# Patient Record
Sex: FEMALE | Race: WHITE | NOT HISPANIC OR LATINO | URBAN - METROPOLITAN AREA
[De-identification: names, ages, dates, MRNs, and addresses within clinical notes are randomized per-mention and may not be internally consistent; named-entity substitution may affect disease eponyms.]

---

## 2020-09-17 ENCOUNTER — OFFICE VISIT (OUTPATIENT)
Dept: URBAN - METROPOLITAN AREA CLINIC 13 | Facility: CLINIC | Age: 70
End: 2020-09-17

## 2020-09-18 PROBLEM — 70153002 HEMORRHOIDS: Status: ACTIVE | Noted: 2020-09-18

## 2020-09-18 PROBLEM — 13644009 HYPERCHOLESTEROLEMIA: Status: ACTIVE | Noted: 2020-09-18

## 2020-09-18 PROBLEM — 313436004 TYPE II DIABETES MELLITUS WITHOUT COMPLICATION: Status: ACTIVE | Noted: 2020-09-18

## 2020-09-18 PROBLEM — 38341003 HYPERTENSION: Status: ACTIVE | Noted: 2020-09-18

## 2020-09-18 PROBLEM — 398050005 DIVERTICULAR DISEASE OF COLON: Status: ACTIVE | Noted: 2020-09-18

## 2020-10-12 ENCOUNTER — OFFICE VISIT (OUTPATIENT)
Dept: URBAN - METROPOLITAN AREA SURGERY CENTER 28 | Facility: SURGERY CENTER | Age: 70
End: 2020-10-12

## 2021-08-28 ENCOUNTER — TELEPHONE ENCOUNTER (OUTPATIENT)
Dept: URBAN - METROPOLITAN AREA CLINIC 13 | Facility: CLINIC | Age: 71
End: 2021-08-28

## 2021-08-29 ENCOUNTER — TELEPHONE ENCOUNTER (OUTPATIENT)
Dept: URBAN - METROPOLITAN AREA CLINIC 13 | Facility: CLINIC | Age: 71
End: 2021-08-29

## 2021-08-29 RX ORDER — LISINOPRIL AND HYDROCHLOROTHIAZIDE TABLETS 10; 12.5 MG/1; MG/1
TABLET ORAL
Status: ACTIVE | COMMUNITY

## 2021-08-29 RX ORDER — ONDANSETRON HYDROCHLORIDE 4 MG/1
TABLET, FILM COATED ORAL
Status: ACTIVE | COMMUNITY
Start: 2020-10-13

## 2021-08-29 RX ORDER — ASPIRIN 81 MG/1
TABLET ORAL
Status: ACTIVE | COMMUNITY

## 2021-08-29 RX ORDER — METFORMIN HYDROCHLORIDE 500 MG/1
TABLET ORAL
Status: ACTIVE | COMMUNITY

## 2023-11-07 ENCOUNTER — LAB OUTSIDE AN ENCOUNTER (OUTPATIENT)
Dept: URBAN - METROPOLITAN AREA CLINIC 44 | Facility: CLINIC | Age: 73
End: 2023-11-07

## 2023-11-07 ENCOUNTER — DASHBOARD ENCOUNTERS (OUTPATIENT)
Age: 73
End: 2023-11-07

## 2023-11-07 ENCOUNTER — OFFICE VISIT (OUTPATIENT)
Dept: URBAN - METROPOLITAN AREA CLINIC 44 | Facility: CLINIC | Age: 73
End: 2023-11-07
Payer: MEDICARE

## 2023-11-07 VITALS
DIASTOLIC BLOOD PRESSURE: 71 MMHG | TEMPERATURE: 97.3 F | WEIGHT: 172.6 LBS | BODY MASS INDEX: 31.76 KG/M2 | SYSTOLIC BLOOD PRESSURE: 154 MMHG | HEART RATE: 74 BPM | HEIGHT: 62 IN

## 2023-11-07 DIAGNOSIS — R10.30 LOWER ABDOMINAL PAIN: ICD-10-CM

## 2023-11-07 PROCEDURE — 99204 OFFICE O/P NEW MOD 45 MIN: CPT | Performed by: INTERNAL MEDICINE

## 2023-11-07 RX ORDER — TRIAMCINOLONE ACETONIDE 1 MG/G
1 APPLICATION CREAM TOPICAL
Refills: 1 | Status: ACTIVE | COMMUNITY

## 2023-11-07 RX ORDER — LOSARTAN POTASSIUM AND HYDROCHLOROTHIAZIDE 50; 12.5 MG/1; MG/1
1 TABLET TABLET, FILM COATED ORAL ONCE A DAY
Qty: 90 TABLET | Status: ACTIVE | COMMUNITY

## 2023-11-07 RX ORDER — CIPROFLOXACIN HYDROCHLORIDE 500 MG/1
1 TABLET TABLET, FILM COATED ORAL
Qty: 6 | OUTPATIENT
Start: 2023-11-07 | End: 2023-11-10

## 2023-11-07 RX ORDER — METFORMIN HYDROCHLORIDE 1000 MG/1
1 TABLET WITH A MEAL TABLET ORAL ONCE A DAY
Qty: 90 TABLET | Status: ACTIVE | COMMUNITY

## 2023-11-07 RX ORDER — PIOGLITAZONE 15 MG/1
1 TABLET TABLET ORAL ONCE A DAY
Qty: 90 TABLET | Status: ACTIVE | COMMUNITY

## 2023-11-07 RX ORDER — SULFAMETHOXAZOLE AND TRIMETHOPRIM 800; 160 MG/1; MG/1
1 TABLET TABLET ORAL TWICE A DAY
Qty: 10 TABLET | Refills: 1 | Status: ACTIVE | COMMUNITY

## 2023-11-07 RX ORDER — ASPIRIN 81 MG/1
1 TABLET TABLET ORAL ONCE A DAY
Status: ACTIVE | COMMUNITY

## 2023-11-07 RX ORDER — DILTIAZEM HYDROCHLORIDE 180 MG/1
1 CAPSULE CAPSULE, EXTENDED RELEASE ORAL ONCE A DAY
Qty: 90 CAPSULE | Status: ACTIVE | COMMUNITY

## 2023-11-07 NOTE — HPI-TODAY'S VISIT:
72 yo F presents for an OV for evaluation of chronic LLQ pain. Onset began 3 weeks ago and appears to be progressively worsened.  We last saw her in 2020 for a colonosopy that revealed severe diverticulosis during an admission to [facility] for bloody diarrhea and acute drop in blood indices; but no requiring blood products. Hb dropped from 14(nl) to 11. Work up during the admission revealed mild diverticulitis. In addition, she has a history of tubular adenomas seen on her last colonoscopy in 2016 and hx of ischemic colitis. She presents today s/p  watery diarrheal episodes and thin caliber stools. Mentions occasional streaks of blood in the stool.  Medication reconciliation: No blood thinners or NSAID use.  Her pain is about a 10 at its worse.

## 2023-11-15 ENCOUNTER — OUT OF OFFICE VISIT (OUTPATIENT)
Dept: URBAN - METROPOLITAN AREA SURGERY CENTER 28 | Facility: SURGERY CENTER | Age: 73
End: 2023-11-15
Payer: MEDICARE

## 2023-11-15 DIAGNOSIS — R10.84 ABDOMINAL CRAMPING, GENERALIZED: ICD-10-CM

## 2023-11-15 DIAGNOSIS — Z53.8 FAILED ATTEMPTED SURGICAL PROCEDURE: ICD-10-CM

## 2023-11-15 DIAGNOSIS — R10.30 LOWER ABDOMINAL PAIN: ICD-10-CM

## 2023-11-15 DIAGNOSIS — Z53.8 PROCEDURE NOT CARRIED OUT FOR OTHER REASONS: ICD-10-CM

## 2023-11-15 DIAGNOSIS — K57.30 DIVERTICULA, COLON: ICD-10-CM

## 2023-11-15 PROCEDURE — G8907 PT DOC NO EVENTS ON DISCHARG: HCPCS | Performed by: CLINIC/CENTER

## 2023-11-15 PROCEDURE — 45378 DIAGNOSTIC COLONOSCOPY: CPT | Performed by: CLINIC/CENTER

## 2023-11-15 PROCEDURE — 45378 DIAGNOSTIC COLONOSCOPY: CPT | Performed by: INTERNAL MEDICINE

## 2023-11-15 PROCEDURE — 00811 ANES LWR INTST NDSC NOS: CPT | Performed by: NURSE ANESTHETIST, CERTIFIED REGISTERED

## 2023-11-15 RX ORDER — ASPIRIN 81 MG/1
1 TABLET TABLET ORAL ONCE A DAY
Status: ACTIVE | COMMUNITY

## 2023-11-15 RX ORDER — TRIAMCINOLONE ACETONIDE 1 MG/G
1 APPLICATION CREAM TOPICAL
Refills: 1 | Status: ACTIVE | COMMUNITY

## 2023-11-15 RX ORDER — METFORMIN HYDROCHLORIDE 1000 MG/1
1 TABLET WITH A MEAL TABLET ORAL ONCE A DAY
Qty: 90 TABLET | Status: ACTIVE | COMMUNITY

## 2023-11-15 RX ORDER — LOSARTAN POTASSIUM AND HYDROCHLOROTHIAZIDE 50; 12.5 MG/1; MG/1
1 TABLET TABLET, FILM COATED ORAL ONCE A DAY
Qty: 90 TABLET | Status: ACTIVE | COMMUNITY

## 2023-11-15 RX ORDER — SULFAMETHOXAZOLE AND TRIMETHOPRIM 800; 160 MG/1; MG/1
1 TABLET TABLET ORAL TWICE A DAY
Qty: 10 TABLET | Refills: 1 | Status: ACTIVE | COMMUNITY

## 2023-11-15 RX ORDER — PIOGLITAZONE 15 MG/1
1 TABLET TABLET ORAL ONCE A DAY
Qty: 90 TABLET | Status: ACTIVE | COMMUNITY

## 2023-11-15 RX ORDER — DILTIAZEM HYDROCHLORIDE 180 MG/1
1 CAPSULE CAPSULE, EXTENDED RELEASE ORAL ONCE A DAY
Qty: 90 CAPSULE | Status: ACTIVE | COMMUNITY

## 2023-11-17 ENCOUNTER — TELEPHONE ENCOUNTER (OUTPATIENT)
Dept: URBAN - METROPOLITAN AREA CLINIC 44 | Facility: CLINIC | Age: 73
End: 2023-11-17

## 2023-11-27 ENCOUNTER — TELEPHONE ENCOUNTER (OUTPATIENT)
Dept: URBAN - METROPOLITAN AREA CLINIC 44 | Facility: CLINIC | Age: 73
End: 2023-11-27

## 2023-11-27 RX ORDER — TENAPANOR HYDROCHLORIDE 53.2 MG/1
1 TABLET IMMEDIATELY BEFORE MEALS TABLET ORAL TWICE A DAY
Qty: 60 | Refills: 11 | OUTPATIENT
Start: 2023-11-28 | End: 2024-11-22

## 2023-12-01 ENCOUNTER — TELEPHONE ENCOUNTER (OUTPATIENT)
Dept: URBAN - METROPOLITAN AREA CLINIC 44 | Facility: CLINIC | Age: 73
End: 2023-12-01

## 2023-12-06 ENCOUNTER — TELEPHONE ENCOUNTER (OUTPATIENT)
Dept: URBAN - METROPOLITAN AREA CLINIC 44 | Facility: CLINIC | Age: 73
End: 2023-12-06

## 2023-12-08 ENCOUNTER — WEB ENCOUNTER (OUTPATIENT)
Dept: URBAN - METROPOLITAN AREA CLINIC 46 | Facility: CLINIC | Age: 73
End: 2023-12-08

## 2023-12-20 ENCOUNTER — WEB ENCOUNTER (OUTPATIENT)
Dept: URBAN - METROPOLITAN AREA CLINIC 46 | Facility: CLINIC | Age: 73
End: 2023-12-20

## 2023-12-21 ENCOUNTER — WEB ENCOUNTER (OUTPATIENT)
Dept: URBAN - METROPOLITAN AREA CLINIC 46 | Facility: CLINIC | Age: 73
End: 2023-12-21

## 2023-12-22 ENCOUNTER — TELEPHONE ENCOUNTER (OUTPATIENT)
Dept: URBAN - METROPOLITAN AREA CLINIC 46 | Facility: CLINIC | Age: 73
End: 2023-12-22

## 2024-01-08 ENCOUNTER — TELEPHONE ENCOUNTER (OUTPATIENT)
Dept: URBAN - METROPOLITAN AREA CLINIC 46 | Facility: CLINIC | Age: 74
End: 2024-01-08

## 2024-01-10 ENCOUNTER — LAB OUTSIDE AN ENCOUNTER (OUTPATIENT)
Dept: URBAN - METROPOLITAN AREA CLINIC 46 | Facility: CLINIC | Age: 74
End: 2024-01-10

## 2024-01-17 ENCOUNTER — OFFICE VISIT (OUTPATIENT)
Dept: URBAN - METROPOLITAN AREA SURGERY CENTER 28 | Facility: SURGERY CENTER | Age: 74
End: 2024-01-17

## 2024-02-06 ENCOUNTER — COLON (OUTPATIENT)
Dept: URBAN - METROPOLITAN AREA SURGERY CENTER 28 | Facility: SURGERY CENTER | Age: 74
End: 2024-02-06
Payer: MEDICARE

## 2024-02-06 ENCOUNTER — LAB (OUTPATIENT)
Dept: URBAN - METROPOLITAN AREA CLINIC 4 | Facility: CLINIC | Age: 74
End: 2024-02-06
Payer: MEDICARE

## 2024-02-06 DIAGNOSIS — Z86.010 ADENOMAS PERSONAL HISTORY OF COLONIC POLYPS: ICD-10-CM

## 2024-02-06 DIAGNOSIS — Z12.11 ENCOUNTER FOR SCREENING FOR MALIGNANT NEOPLASM OF COLON: ICD-10-CM

## 2024-02-06 DIAGNOSIS — K63.5 BENIGN COLON POLYP: ICD-10-CM

## 2024-02-06 DIAGNOSIS — Z86.010 PERSONAL HISTORY OF COLONIC POLYPS: ICD-10-CM

## 2024-02-06 PROCEDURE — 45385 COLONOSCOPY W/LESION REMOVAL: CPT | Performed by: INTERNAL MEDICINE

## 2024-02-06 PROCEDURE — 88302 TISSUE EXAM BY PATHOLOGIST: CPT | Performed by: PATHOLOGY

## 2024-02-06 RX ORDER — METFORMIN HYDROCHLORIDE 1000 MG/1
1 TABLET WITH A MEAL TABLET ORAL ONCE A DAY
Qty: 90 TABLET | Status: ACTIVE | COMMUNITY

## 2024-02-06 RX ORDER — SULFAMETHOXAZOLE AND TRIMETHOPRIM 800; 160 MG/1; MG/1
1 TABLET TABLET ORAL TWICE A DAY
Qty: 10 TABLET | Refills: 1 | Status: ACTIVE | COMMUNITY

## 2024-02-06 RX ORDER — ASPIRIN 81 MG/1
1 TABLET TABLET ORAL ONCE A DAY
Status: ACTIVE | COMMUNITY

## 2024-02-06 RX ORDER — LOSARTAN POTASSIUM AND HYDROCHLOROTHIAZIDE 50; 12.5 MG/1; MG/1
1 TABLET TABLET, FILM COATED ORAL ONCE A DAY
Qty: 90 TABLET | Status: ACTIVE | COMMUNITY

## 2024-02-06 RX ORDER — TRIAMCINOLONE ACETONIDE 1 MG/G
1 APPLICATION CREAM TOPICAL
Refills: 1 | Status: ACTIVE | COMMUNITY

## 2024-02-06 RX ORDER — TENAPANOR HYDROCHLORIDE 53.2 MG/1
1 TABLET IMMEDIATELY BEFORE MEALS TABLET ORAL TWICE A DAY
Qty: 60 | Refills: 11 | Status: ACTIVE | COMMUNITY
Start: 2023-11-28 | End: 2024-11-22

## 2024-02-06 RX ORDER — PIOGLITAZONE 15 MG/1
1 TABLET TABLET ORAL ONCE A DAY
Qty: 90 TABLET | Status: ACTIVE | COMMUNITY

## 2024-02-06 RX ORDER — DILTIAZEM HYDROCHLORIDE 180 MG/1
1 CAPSULE CAPSULE, EXTENDED RELEASE ORAL ONCE A DAY
Qty: 90 CAPSULE | Status: ACTIVE | COMMUNITY

## 2025-02-27 ENCOUNTER — TELEPHONE ENCOUNTER (OUTPATIENT)
Dept: URBAN - METROPOLITAN AREA CLINIC 46 | Facility: CLINIC | Age: 75
End: 2025-02-27

## 2025-02-27 RX ORDER — TENAPANOR HYDROCHLORIDE 53.2 MG/1
1 TABLET IMMEDIATELY BEFORE MEALS TABLET ORAL TWICE A DAY
Qty: 60 | Refills: 11
Start: 2023-11-28 | End: 2026-02-22

## 2025-05-08 ENCOUNTER — OFFICE VISIT (OUTPATIENT)
Dept: URBAN - METROPOLITAN AREA CLINIC 46 | Facility: CLINIC | Age: 75
End: 2025-05-08
Payer: MEDICARE

## 2025-05-08 DIAGNOSIS — Z09 ENCOUNTER FOR FOLLOW-UP: ICD-10-CM

## 2025-05-08 DIAGNOSIS — K57.30 DVRTCLOS OF LG INT W/O PERFORATION OR ABSCESS W/O BLEEDING: ICD-10-CM

## 2025-05-08 DIAGNOSIS — Z86.0101 PERSONAL HISTORY OF ADENOMATOUS AND SERRATED COLON POLYPS: ICD-10-CM

## 2025-05-08 DIAGNOSIS — K58.1 IRRITABLE BOWEL SYNDROME WITH CONSTIPATION: ICD-10-CM

## 2025-05-08 PROBLEM — 440630006: Status: ACTIVE | Noted: 2025-05-08

## 2025-05-08 PROCEDURE — 99213 OFFICE O/P EST LOW 20 MIN: CPT | Performed by: PHYSICIAN ASSISTANT

## 2025-05-08 RX ORDER — TRIAMCINOLONE ACETONIDE 1 MG/G
1 APPLICATION CREAM TOPICAL
Refills: 1 | Status: ACTIVE | COMMUNITY

## 2025-05-08 RX ORDER — ASPIRIN 81 MG/1
1 TABLET TABLET ORAL ONCE A DAY
Status: ACTIVE | COMMUNITY

## 2025-05-08 RX ORDER — DILTIAZEM HYDROCHLORIDE 180 MG/1
1 CAPSULE CAPSULE, EXTENDED RELEASE ORAL ONCE A DAY
Qty: 90 CAPSULE | Status: ACTIVE | COMMUNITY

## 2025-05-08 RX ORDER — PIOGLITAZONE 15 MG/1
1 TABLET TABLET ORAL ONCE A DAY
Qty: 90 TABLET | Status: ACTIVE | COMMUNITY

## 2025-05-08 RX ORDER — TENAPANOR HYDROCHLORIDE 53.2 MG/1
1 TABLET IMMEDIATELY BEFORE MEALS TABLET ORAL TWICE A DAY
OUTPATIENT
Start: 2023-11-28

## 2025-05-08 RX ORDER — SULFAMETHOXAZOLE AND TRIMETHOPRIM 800; 160 MG/1; MG/1
1 TABLET TABLET ORAL TWICE A DAY
Qty: 10 TABLET | Refills: 1 | Status: ACTIVE | COMMUNITY

## 2025-05-08 RX ORDER — LOSARTAN POTASSIUM AND HYDROCHLOROTHIAZIDE 50; 12.5 MG/1; MG/1
1 TABLET TABLET, FILM COATED ORAL ONCE A DAY
Qty: 90 TABLET | Status: ACTIVE | COMMUNITY

## 2025-05-08 RX ORDER — TENAPANOR HYDROCHLORIDE 53.2 MG/1
1 TABLET IMMEDIATELY BEFORE MEALS TABLET ORAL TWICE A DAY
Qty: 60 | Refills: 11 | Status: ACTIVE | COMMUNITY
Start: 2023-11-28 | End: 2026-02-22

## 2025-05-08 RX ORDER — METFORMIN HYDROCHLORIDE 1000 MG/1
1 TABLET WITH A MEAL TABLET ORAL ONCE A DAY
Qty: 90 TABLET | Status: ACTIVE | COMMUNITY

## 2025-05-08 NOTE — HPI-TODAY'S VISIT:
76 yo F presents for an OV for follow up on IBS-C. She has been very pleased with IBSrela which she takes BID, and has a formed stool every other day. She now has only very occasional LLQ pain which resolves with use of a heating pad for 5-10 minutes. No blood in the stool. No other new concerns.   She had an unremarkable CBC 6/2024 and normal CMP 12/2024. Colonosopy 2020 revealed severe diverticulosis during an admission to [facility] for bloody diarrhea and acute drop in blood indices; but no requiring blood products. Hb dropped from 14(nl) to 11. Work up during the admission revealed mild diverticulitis. In addition, she has a history of tubular adenomas seen on her last colonoscopy in 2016 and hx of ischemic colitis. Repeat colonoscopy 2/2024 with severe diverticulosis and removal of a small polyp; path showed fecal material only; no further surveillance advised due to age.